# Patient Record
Sex: MALE | Race: BLACK OR AFRICAN AMERICAN | NOT HISPANIC OR LATINO | Employment: UNEMPLOYED | URBAN - METROPOLITAN AREA
[De-identification: names, ages, dates, MRNs, and addresses within clinical notes are randomized per-mention and may not be internally consistent; named-entity substitution may affect disease eponyms.]

---

## 2017-01-10 ENCOUNTER — TRANSCRIBE ORDERS (OUTPATIENT)
Dept: ADMINISTRATIVE | Facility: HOSPITAL | Age: 47
End: 2017-01-10

## 2017-01-10 DIAGNOSIS — R91.8 PULMONARY NODULES: Primary | ICD-10-CM

## 2017-01-27 ENCOUNTER — HOSPITAL ENCOUNTER (OUTPATIENT)
Dept: CT IMAGING | Facility: HOSPITAL | Age: 47
Discharge: HOME | End: 2017-01-27
Admitting: INTERNAL MEDICINE

## 2017-01-27 DIAGNOSIS — R91.8 PULMONARY NODULES: ICD-10-CM

## 2017-01-27 PROCEDURE — 71250 CT THORAX DX C-: CPT

## 2017-09-07 ENCOUNTER — TRANSCRIBE ORDERS (OUTPATIENT)
Dept: ADMINISTRATIVE | Facility: HOSPITAL | Age: 47
End: 2017-09-07

## 2017-09-07 DIAGNOSIS — R91.8 ABNORMAL X-RAY OF LUNG: Primary | ICD-10-CM

## 2017-09-22 ENCOUNTER — HOSPITAL ENCOUNTER (OUTPATIENT)
Dept: CT IMAGING | Facility: HOSPITAL | Age: 47
Discharge: HOME | End: 2017-09-22
Admitting: INTERNAL MEDICINE

## 2017-09-22 DIAGNOSIS — R91.8 ABNORMAL X-RAY OF LUNG: ICD-10-CM

## 2017-09-22 PROCEDURE — 71250 CT THORAX DX C-: CPT

## 2019-04-08 ENCOUNTER — TRANSCRIBE ORDERS (OUTPATIENT)
Dept: ADMINISTRATIVE | Facility: HOSPITAL | Age: 49
End: 2019-04-08

## 2019-04-08 DIAGNOSIS — R91.8 OTHER NONSPECIFIC ABNORMAL FINDING OF LUNG FIELD: Primary | ICD-10-CM

## 2019-04-18 ENCOUNTER — HOSPITAL ENCOUNTER (OUTPATIENT)
Dept: CT IMAGING | Facility: HOSPITAL | Age: 49
Discharge: HOME | End: 2019-04-18
Admitting: NURSE PRACTITIONER

## 2019-04-18 DIAGNOSIS — R91.8 OTHER NONSPECIFIC ABNORMAL FINDING OF LUNG FIELD: ICD-10-CM

## 2019-04-18 PROCEDURE — 71250 CT THORAX DX C-: CPT

## 2019-06-20 ENCOUNTER — TRANSCRIBE ORDERS (OUTPATIENT)
Dept: ADMINISTRATIVE | Facility: HOSPITAL | Age: 49
End: 2019-06-20

## 2019-06-20 DIAGNOSIS — M25.512 PAIN IN SHOULDER REGION, LEFT: Primary | ICD-10-CM

## 2019-07-22 ENCOUNTER — OFFICE VISIT (OUTPATIENT)
Dept: NEUROSURGERY | Facility: CLINIC | Age: 49
End: 2019-07-22

## 2019-07-22 VITALS
BODY MASS INDEX: 33.46 KG/M2 | DIASTOLIC BLOOD PRESSURE: 79 MMHG | HEART RATE: 72 BPM | SYSTOLIC BLOOD PRESSURE: 147 MMHG | HEIGHT: 71 IN | WEIGHT: 239 LBS

## 2019-07-22 DIAGNOSIS — Z00.00 HEALTHCARE MAINTENANCE: ICD-10-CM

## 2019-07-22 DIAGNOSIS — M54.12 CERVICAL RADICULOPATHY DUE TO TRAUMA: Primary | ICD-10-CM

## 2019-07-22 DIAGNOSIS — M54.2 NECK PAIN: ICD-10-CM

## 2019-07-22 PROCEDURE — 99203 OFFICE O/P NEW LOW 30 MIN: CPT | Performed by: PHYSICIAN ASSISTANT

## 2019-07-22 RX ORDER — ALBUTEROL SULFATE 2.5 MG/3ML
SOLUTION RESPIRATORY (INHALATION)
COMMUNITY
Start: 2019-07-03

## 2019-07-22 RX ORDER — INSULIN NPH HUM/REG INSULIN HM 70-30/ML
VIAL (ML) SUBCUTANEOUS
COMMUNITY
Start: 2019-07-15

## 2019-07-22 RX ORDER — LACOSAMIDE 50 MG
TABLET ORAL
Refills: 0 | COMMUNITY
Start: 2019-05-21

## 2019-07-22 RX ORDER — CALCITRIOL 0.25 UG/1
CAPSULE, LIQUID FILLED ORAL
COMMUNITY
Start: 2019-05-06

## 2019-07-22 RX ORDER — GABAPENTIN 300 MG/1
300 CAPSULE ORAL
Qty: 30 CAPSULE | Refills: 0 | Status: SHIPPED | OUTPATIENT
Start: 2019-07-22 | End: 2019-07-22 | Stop reason: ALTCHOICE

## 2019-07-22 RX ORDER — CARVEDILOL 25 MG/1
TABLET ORAL
COMMUNITY
Start: 2019-07-03

## 2019-07-22 RX ORDER — FUROSEMIDE 20 MG/1
TABLET ORAL
COMMUNITY
Start: 2019-05-06 | End: 2019-09-09 | Stop reason: ALTCHOICE

## 2019-07-22 RX ORDER — PREDNISOLONE ACETATE 10 MG/ML
1 SUSPENSION/ DROPS OPHTHALMIC 4 TIMES DAILY
COMMUNITY
Start: 2014-09-04

## 2019-07-22 RX ORDER — ALLOPURINOL 100 MG/1
TABLET ORAL
COMMUNITY
Start: 2019-07-03

## 2019-07-22 RX ORDER — METOPROLOL TARTRATE 50 MG/1
25 TABLET, FILM COATED ORAL DAILY
COMMUNITY

## 2019-07-22 RX ORDER — OFLOXACIN 3 MG/ML
SOLUTION/ DROPS OPHTHALMIC
Refills: 0 | COMMUNITY
Start: 2019-06-24

## 2019-07-22 RX ORDER — AMLODIPINE BESYLATE 10 MG/1
TABLET ORAL
COMMUNITY
Start: 2019-07-03
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
## 2019-07-31 ENCOUNTER — HOSPITAL ENCOUNTER (OUTPATIENT)
Dept: MRI IMAGING | Facility: HOSPITAL | Age: 49
Discharge: HOME | End: 2019-07-31
Admitting: PHYSICIAN ASSISTANT

## 2019-07-31 DIAGNOSIS — M54.12 CERVICAL RADICULOPATHY DUE TO TRAUMA: ICD-10-CM

## 2019-07-31 PROCEDURE — 72141 MRI NECK SPINE W/O DYE: CPT

## 2019-08-19 ENCOUNTER — OFFICE VISIT (OUTPATIENT)
Dept: NEUROSURGERY | Facility: CLINIC | Age: 49
End: 2019-08-19

## 2019-08-19 VITALS
OXYGEN SATURATION: 99 % | HEIGHT: 71 IN | WEIGHT: 240 LBS | HEART RATE: 73 BPM | SYSTOLIC BLOOD PRESSURE: 144 MMHG | DIASTOLIC BLOOD PRESSURE: 75 MMHG | BODY MASS INDEX: 33.6 KG/M2 | RESPIRATION RATE: 18 BRPM

## 2019-08-19 DIAGNOSIS — M54.12 BRACHIAL NEURITIS OR RADICULITIS: Primary | ICD-10-CM

## 2019-08-19 PROCEDURE — 99213 OFFICE O/P EST LOW 20 MIN: CPT | Performed by: NEUROLOGICAL SURGERY

## 2019-08-19 RX ORDER — GABAPENTIN 300 MG/1
300 CAPSULE ORAL NIGHTLY
Qty: 90 CAPSULE | Refills: 2 | Status: SHIPPED | OUTPATIENT
Start: 2019-08-19

## 2019-08-19 NOTE — PROGRESS NOTES
"Subjective   Wilfredo Baldwin is a 49 y.o. male.     Chief Complaint   Patient presents with   • Follow-up     f/u after MRI      Visit Vitals  /75 (BP Location: Left arm, Patient Position: Sitting, Cuff Size: Large Adult)   Pulse 73   Resp 18   Ht 180.3 cm (71\")   Wt 109 kg (240 lb)   SpO2 99%   BMI 33.47 kg/m²       History of Present Illness: Mr. Baldwin is here today for follow-up review of his MRI of the cervical spine.  He states that he cannot take the anti-inflammatory secondary to his kidney disease and he did not try the Neurontin secondary to trying it before for his diabetic neuropathy.  He still describes tightness and pain in the left parascapular region just at the mid scapular border.  It does radiate down the arms at times.  He denies any gait dysfunction, urinary urgency or incontinence.  He does subjectively feel like his hand is weaker.    The following portions of the patient's history were reviewed and updated as appropriate: allergies, current medications, past family history, past medical history, past social history, past surgical history and problem list.    Review of Systems      Past Surgical History:   Procedure Laterality Date   • EYE SURGERY         Past Medical History:   Diagnosis Date   • COPD (chronic obstructive pulmonary disease) (CMS/[facility])    • Diabetes mellitus (CMS/[facility])    • Hypertension    • Seizures (CMS/[facility])        Objective   Physical Exam  Neurologic Exam  Ortho Exam  Motor strength is 5/5 in both upper and lower extremities except for left  which is 4-5 with very poor effort.  No demonstrable sensory deficit in a dermatomal pattern  Tender to palpation left paraspinal musculature  Normal shoulder exam of the left  Improvement of neck pain on traction  Decreased cervical range of motion with 45 degrees flexion, 40 degrees extension, 35 degrees bilateral rotation    DATE OF EXAM:   7/31/2019 9:46 AM     PROCEDURE:   MRI CERVICAL SPINE WO CONTRAST-   "   INDICATIONS:   Cervical myelopathy; M54.12-Radiculopathy, cervical region     COMPARISON:  07/22/2019.     TECHNIQUE:   Routine magnetic resonance imaging of the cervical spine was performed  without administration of contrast.     FINDINGS:   A routine cervical spine MRI exam was performed. No cord signal  abnormality is seen. No anteroposterior alignment abnormality is  identified. The cerebellar tonsils are within normal limits.     On axial imaging at C2-C3, no spinal canal or foraminal narrowing. No  significant disc herniation.     At C3-4, mild diffuse annular disc bulge is seen. There may be a tiny  left paracentral disc herniation (protrusion), which measures  approximately 0.5 x 0.2 cm in transverse and AP extent, respectively, as  seen on image 9 of series 6. There is mild left foraminal narrowing and  no significant right foraminal narrowing. No significant spinal canal  narrowing is seen.     At C4-5, there is mild left foraminal narrowing. No significant right  foraminal narrowing. Minimal diffuse annular disc bulge is seen. No disc  herniation. No spinal canal narrowing.     At C5-6, there is moderate to severe left foraminal narrowing. Minimal  if any right foraminal narrowing is seen. There is mild diffuse annular  disc bulge. Minimal uncovertebral osteoarthritis is suggested. Minimal  if any facet osteoarthritis is seen. There is minimal spinal canal  narrowing. No disc herniation is suggested.     At C6-7, there is mild bilateral foraminal narrowing. Mild diffuse  annular disc bulge is seen. Minimal if any spinal canal narrowing is  seen. No disc herniation is identified.     At C7-T1, no spinal canal or foraminal narrowing. No disc herniation is  seen.     At T1-2, no spinal canal or foraminal narrowing. No disc herniation is  seen.     Additionally, no acute vertebral compression fractures are seen. The  C1-2 level was not completely imaged axially.     IMPRESSION:  IMPRESSION :   There is  left-sided foraminal narrowing at several levels, greatest at  C5-6. Please correlate clinically, especially with C6 radicular  symptoms.     No significant disc herniations are seen.     No significant spinal canal narrowing.     No anteroposterior alignment abnormality is seen.     No acute cervical spine vertebral compression fractures are seen.     No cord signal abnormalities appreciated.     Please see above comments for further detail.     Electronically Signed By-Dr. Anirudh Sinclair MD On:7/31/2019 2:26 PM  This report was finalized on [id number] by Dr. Anirudh Sinclair MD.      Assessment and Plan: I have personally reviewed the MRI of the cervical spine.  There was report of left-sided neuroforaminal stenosis greatest at C5-6 I do not see any significant neuroforaminal stenosis.  His motor effort does not match the pattern and he does suffer from diabetic neuropathy.  I feel he suffers more from radiculitis than actual radiculopathy.  He has known diabetic neuropathy which I think predisposes him for neuritis.  I do not feel with his diabetes that steroids are appropriate at this time and unfortunately with his renal function we cannot start him on anti-inflammatories.  At this time we will start a course of cervical traction at 20-25 pounds for 20-30 minutes 3 times a day.  Also start a course of Neurontin 300 mg p.o. nightly which she can increase to 300 mg twice daily within 2 weeks.  I will also refer him to interventional pain management for dedicated epidural injections.  I will see him back in approximately 1 month to see how he has responded.      Problems Addressed this Visit     None

## 2019-08-19 NOTE — PROGRESS NOTES
Spoke with patient, will come this AM prior to lunch.  Dr. Corbett has a drain to put in at the hospital so Mr. Baldwin will be here momentarily.

## 2019-09-09 ENCOUNTER — OFFICE VISIT (OUTPATIENT)
Dept: PAIN MEDICINE | Facility: CLINIC | Age: 49
End: 2019-09-09

## 2019-09-09 VITALS
SYSTOLIC BLOOD PRESSURE: 149 MMHG | HEIGHT: 71 IN | DIASTOLIC BLOOD PRESSURE: 88 MMHG | RESPIRATION RATE: 16 BRPM | WEIGHT: 239 LBS | TEMPERATURE: 97.7 F | HEART RATE: 67 BPM | OXYGEN SATURATION: 98 % | BODY MASS INDEX: 33.46 KG/M2

## 2019-09-09 DIAGNOSIS — M54.12 CERVICAL RADICULITIS: ICD-10-CM

## 2019-09-09 DIAGNOSIS — M54.2 CERVICALGIA: Primary | ICD-10-CM

## 2019-09-09 DIAGNOSIS — M50.30 DDD (DEGENERATIVE DISC DISEASE), CERVICAL: ICD-10-CM

## 2019-09-09 DIAGNOSIS — M47.812 CERVICAL SPONDYLOSIS WITHOUT MYELOPATHY: ICD-10-CM

## 2019-09-09 PROCEDURE — 99204 OFFICE O/P NEW MOD 45 MIN: CPT | Performed by: PHYSICAL MEDICINE & REHABILITATION

## 2019-09-09 PROCEDURE — G0463 HOSPITAL OUTPT CLINIC VISIT: HCPCS | Performed by: PHYSICAL MEDICINE & REHABILITATION

## 2019-09-09 RX ORDER — SYRINGE AND NEEDLE,INSULIN,1ML 31 GX5/16"
SYRINGE, EMPTY DISPOSABLE MISCELLANEOUS
COMMUNITY
Start: 2019-07-15

## 2019-09-09 RX ORDER — BLOOD-GLUCOSE METER
KIT MISCELLANEOUS
COMMUNITY
Start: 2019-09-03

## 2019-09-09 RX ORDER — ERGOCALCIFEROL 1.25 MG/1
CAPSULE ORAL
COMMUNITY
Start: 2019-09-03

## 2019-09-09 RX ORDER — FUROSEMIDE 40 MG/1
TABLET ORAL
COMMUNITY
Start: 2019-09-03

## 2019-09-09 NOTE — PROGRESS NOTES
Subjective   Wilfredo Baldwin is a 49 y.o. male.     Neck pain radiating to left shoulder, 9/10 at worst, and best, constant, always present, burning, aching, worse with walking and standing, interferes with sleep, failed chiropractor. MRI C-spine with DDD and DJD. Saw Dr. Corbett, notes reviewed, as above, with referral for cervical ESIs, impaired renal function precluding NSAIDs and contrast medium. Denies anticoagulation. No FH of substance abuse.         The following portions of the patient's history were reviewed and updated as appropriate: allergies, current medications, past family history, past medical history, past social history, past surgical history and problem list.    Review of Systems   Constitutional: Positive for fatigue. Negative for chills and fever.   HENT: Positive for trouble swallowing. Negative for hearing loss.    Eyes: Positive for visual disturbance.   Respiratory: Positive for shortness of breath.    Cardiovascular: Negative for chest pain.   Gastrointestinal: Positive for abdominal pain, diarrhea and vomiting. Negative for constipation and nausea.   Genitourinary: Negative for urinary incontinence.   Musculoskeletal: Negative for arthralgias, back pain, joint swelling, myalgias and neck pain.   Neurological: Negative for dizziness, weakness, numbness and headache.       Objective   Physical Exam   Constitutional: He is oriented to person, place, and time. He appears well-developed and well-nourished.   HENT:   Head: Normocephalic and atraumatic.   Eyes: EOM are normal. Pupils are equal, round, and reactive to light.   Cardiovascular: Normal rate, regular rhythm, normal heart sounds and intact distal pulses.   Pulmonary/Chest: Breath sounds normal.   Abdominal: Soft. Bowel sounds are normal. He exhibits no distension. There is no tenderness.   Neurological: He is alert and oriented to person, place, and time. He has normal strength and normal reflexes. He displays normal reflexes. No  sensory deficit.   Psychiatric: He has a normal mood and affect. His behavior is normal. Thought content normal.         Assessment/Plan   Wilfredo was seen today for neck pain, shoulder pain, arm pain and headache.    Diagnoses and all orders for this visit:    Cervicalgia    Cervical spondylosis without myelopathy    Cervical radiculitis    DDD (degenerative disc disease), cervical        Schedule 3 left cervical ESIs without contrast.  No change to meds today, taking Gabapentin with Dr. Corbett.  RTC for ESIs.

## 2019-09-11 ENCOUNTER — APPOINTMENT (OUTPATIENT)
Dept: PHYSICAL THERAPY | Facility: HOSPITAL | Age: 49
End: 2019-09-11

## 2019-09-16 ENCOUNTER — OFFICE VISIT (OUTPATIENT)
Dept: PHYSICAL THERAPY | Facility: CLINIC | Age: 49
End: 2019-09-16

## 2019-09-16 DIAGNOSIS — M54.2 CERVICALGIA: ICD-10-CM

## 2019-09-16 DIAGNOSIS — M54.12 BRACHIAL NEURITIS: Primary | ICD-10-CM

## 2019-09-16 PROCEDURE — 97140 MANUAL THERAPY 1/> REGIONS: CPT | Performed by: PHYSICAL THERAPIST

## 2019-09-16 PROCEDURE — 97161 PT EVAL LOW COMPLEX 20 MIN: CPT | Performed by: PHYSICAL THERAPIST

## 2019-09-16 PROCEDURE — 97014 ELECTRIC STIMULATION THERAPY: CPT | Performed by: PHYSICAL THERAPIST
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
## 2019-09-24 ENCOUNTER — TREATMENT (OUTPATIENT)
Dept: PHYSICAL THERAPY | Facility: CLINIC | Age: 49
End: 2019-09-24

## 2019-09-24 DIAGNOSIS — M54.12 BRACHIAL NEURITIS: Primary | ICD-10-CM

## 2019-09-24 DIAGNOSIS — M54.2 CERVICALGIA: ICD-10-CM

## 2019-09-24 PROCEDURE — 97140 MANUAL THERAPY 1/> REGIONS: CPT | Performed by: PHYSICAL THERAPIST

## 2019-09-24 PROCEDURE — 97014 ELECTRIC STIMULATION THERAPY: CPT | Performed by: PHYSICAL THERAPIST

## 2019-09-24 PROCEDURE — 97110 THERAPEUTIC EXERCISES: CPT | Performed by: PHYSICAL THERAPIST

## 2019-09-24 NOTE — PROGRESS NOTES
Physical Therapy Daily Progress Note    VISIT#: 2    Subjective   Wilfredo Baldwin reports that he has still had a lot of pain in the middle spine lately. He is wanting to get his injections sooner.  Pain Ratin    Objective     See Exercise, Manual, and Modality Logs for complete treatment.     Patient Education: importance of movement to reduce pain    Assessment & Plan     Assessment  Assessment details: The patient demonstrated an increase in tone of the left upper trapezius. The patient demonstrated wincing with palpation of all cervical musculature stating that it increase his pain. Patient had a decrease in symptoms following modalities.        Progress per Plan of Care and Progress strengthening /stabilization /functional activity          Timed:         Manual Therapy:    15     mins  74295;     Therapeutic Exercise:    15     mins  72753;     Neuromuscular Nellie:        mins  47763;    Therapeutic Activity:          mins  63630;     Gait Training:           mins  78348;     Ultrasound:          mins  46221;    Ionto                                   mins   28257  Self Care                            mins   67645  Canalith Repos                   mins  83587    Un-Timed:  Electrical Stimulation:    15     mins  58032 ( );  Dry Needling          mins self-pay  Traction          mins 82195  Low Eval          Mins  82483  Mod Eval          Mins  43679  High Eval                            Mins  91167  Re-Eval                               mins  57591    Timed Treatment:   30   mins   Total Treatment:     50   mins    Merle Hernández PT  Physical Therapist  [place] License # [id number]

## 2019-09-26 ENCOUNTER — TREATMENT (OUTPATIENT)
Dept: PHYSICAL THERAPY | Facility: CLINIC | Age: 49
End: 2019-09-26

## 2019-09-26 DIAGNOSIS — M54.12 BRACHIAL NEURITIS: Primary | ICD-10-CM

## 2019-09-26 DIAGNOSIS — M54.2 CERVICALGIA: ICD-10-CM

## 2019-09-26 PROCEDURE — 97014 ELECTRIC STIMULATION THERAPY: CPT | Performed by: PHYSICAL THERAPIST

## 2019-09-26 PROCEDURE — 97140 MANUAL THERAPY 1/> REGIONS: CPT | Performed by: PHYSICAL THERAPIST

## 2019-09-26 PROCEDURE — 97110 THERAPEUTIC EXERCISES: CPT | Performed by: PHYSICAL THERAPIST

## 2019-09-26 NOTE — PROGRESS NOTES
Physical Therapy Daily Progress Note    VISIT#: 3    Subjective   Wilfredo Baldwin reports that he is getting a little better but he still feels like he needs to get an injection.  Pain Ratin    Objective     See Exercise, Manual, and Modality Logs for complete treatment.     Patient Education: exercise rationale, importance of relaxation of cervical musculature to reduce muscle tone    Assessment & Plan     Assessment  Assessment details: The patient demonstrated wincing and guarding with transitional movements which could be contributing to the hypertonicity of the cervical musculature. The patient was educated on importance of relaxation of the cervical musculature to reduce muscle tone.        Progress per Plan of Care and Progress strengthening /stabilization /functional activity          Timed:         Manual Therapy:    18     mins  71779;     Therapeutic Exercise:    15     mins  95338;     Neuromuscular Nellie:        mins  02124;    Therapeutic Activity:          mins  53251;     Gait Training:           mins  58835;     Ultrasound:          mins  89596;    Ionto                                   mins   09009  Self Care                            mins   01085  Canalith Repos                   mins  16340    Un-Timed:  Electrical Stimulation:    15     mins  41835 ( );  Dry Needling          mins self-pay  Traction          mins 73831  Low Eval          Mins  52347  Mod Eval          Mins  01857  High Eval                            Mins  20133  Re-Eval                               mins  39043    Timed Treatment:   33   mins   Total Treatment:     50   mins    Merle Hernández PT  Physical Therapist  [place] License # [id number]

## 2019-10-01 ENCOUNTER — TREATMENT (OUTPATIENT)
Dept: PHYSICAL THERAPY | Facility: CLINIC | Age: 49
End: 2019-10-01

## 2019-10-01 DIAGNOSIS — M54.12 BRACHIAL NEURITIS: Primary | ICD-10-CM

## 2019-10-01 DIAGNOSIS — M54.2 CERVICALGIA: ICD-10-CM

## 2019-10-01 PROCEDURE — 97140 MANUAL THERAPY 1/> REGIONS: CPT | Performed by: PHYSICAL THERAPIST

## 2019-10-01 PROCEDURE — 97014 ELECTRIC STIMULATION THERAPY: CPT | Performed by: PHYSICAL THERAPIST

## 2019-10-01 PROCEDURE — 97110 THERAPEUTIC EXERCISES: CPT | Performed by: PHYSICAL THERAPIST

## 2019-10-01 NOTE — PROGRESS NOTES
Physical Therapy Daily Progress Note      Patient: Wilfredo Baldwin   : 1970  Diagnosis/ICD-10 Code:  Brachial neuritis [M54.12]  Referring practitioner: Live Corbett MD  Date of Initial Visit: Type: THERAPY  Noted: 2019  Today's Date: 10/1/2019  Patient seen for 4 sessions             Subjective Pt states that he still has pain in his lower cervical/upper thoracic and into his left UT.  He also states that his LB has been hurting him lately.    Objective   See Exercise, Manual, and Modality Logs for complete treatment.       Assessment/Plan  Pt tolerated modalities and manual technique well today.  He was doing well with exercises until the last few reps of addition of t-band rows (elbows bent) where he started having increase burning sensation in his lower cervical/upper thoracic and left UT.  Probably caused by pt holding contraction too long and/or resistance was a little too strong.  Ice was administered to cervical region which seemed to help decrease symptoms.    Progress per Plan of Care           Timed:         Manual Therapy:    15     mins  32362;     Therapeutic Exercise:    20     mins  09594;     Neuromuscular Nellie:        mins  03117;    Therapeutic Activity:          mins  29319;     Gait Training:           mins  62586;     Ultrasound:          mins  98495;    Ionto                                   mins   17351  Self Care                            mins   84592      Un-Timed:  Electrical Stimulation:    15     mins  57878 ( );  Dry Needling          mins self-pay  Traction          mins 62844      Timed Treatment:   35   mins   Total Treatment:     65  mins    Karthik Mansfield PTA  Physical Therapist

## 2019-10-07 ENCOUNTER — HOSPITAL ENCOUNTER (OUTPATIENT)
Dept: PAIN MEDICINE | Facility: HOSPITAL | Age: 49
Discharge: HOME | End: 2019-10-07
Admitting: PHYSICAL MEDICINE & REHABILITATION

## 2019-10-07 VITALS
HEIGHT: 71 IN | BODY MASS INDEX: 32.9 KG/M2 | DIASTOLIC BLOOD PRESSURE: 85 MMHG | OXYGEN SATURATION: 99 % | TEMPERATURE: 97.8 F | SYSTOLIC BLOOD PRESSURE: 157 MMHG | WEIGHT: 235 LBS | HEART RATE: 70 BPM | RESPIRATION RATE: 16 BRPM

## 2019-10-07 DIAGNOSIS — M54.2 CERVICALGIA: Primary | ICD-10-CM

## 2019-10-07 DIAGNOSIS — M54.12 CERVICAL RADICULITIS: ICD-10-CM

## 2019-10-07 PROCEDURE — 62321 NJX INTERLAMINAR CRV/THRC: CPT | Performed by: PHYSICAL MEDICINE & REHABILITATION

## 2019-10-07 PROCEDURE — 25010000002 DEXAMETHASONE SODIUM PHOSPHATE 10 MG/ML SOLUTION

## 2019-10-07 RX ORDER — DEXAMETHASONE SODIUM PHOSPHATE 10 MG/ML
INJECTION, SOLUTION INTRAMUSCULAR; INTRAVENOUS
Status: DISCONTINUED
Start: 2019-10-07 | End: 2019-10-08 | Stop reason: HOSPADM

## 2019-10-07 NOTE — PATIENT INSTRUCTIONS
Epidural Steroid Injection, Care After  Refer to this sheet in the next few weeks. These instructions provide you with information about caring for yourself after your procedure. Your health care provider may also give you more specific instructions. Your treatment has been planned according to current medical practices, but problems sometimes occur. Call your health care provider if you have any problems or questions after your procedure.  What can I expect after the procedure?  After your procedure, it is common to feel a little discomfort at the injection site.  Follow these instructions at home:  · For 24 hours after the procedure:  ? Avoid using heat on the injection site.  ? Do not take a tub bath, and do not soak in water.  ? Do not drive if you received a medicine to help you relax (sedative).  · If directed, put ice on the injection site:  ? Put ice in a plastic bag.  ? Place a towel between your skin and the bag.  ? Leave the ice on for 20 minutes, 2-3 times a day.  · Return to your normal activities as told by your health care provider. Ask your health care provider what activities are safe for you.  · You may remove the bandage (dressing) after 24 hours.  · Take over-the-counter and prescription medicines only as told by your health care provider.  · Keep all follow-up visits as told by your health care provider. This is important.  Contact a health care provider if:  · You have a fever.  · You continue to have pain and soreness around the injection site, even after taking over-the-counter pain medicine.  · You have severe, sudden, or lasting nausea or vomiting.  Get help right away if:  · You have severe pain at the injection site that is not relieved by medicines.  · You develop a severe headache or a stiff neck.  · You become sensitive to light.  · You have any new numbness or weakness in your legs or arms.  · You lose control of your bladder or bowel movements.  · You have trouble breathing.  This  information is not intended to replace advice given to you by your health care provider. Make sure you discuss any questions you have with your health care provider.  Document Released: 04/03/2012 Document Revised: 05/25/2017 Document Reviewed: 04/04/2017  [employer] Interactive Patient Education © 2019 [employer]

## 2019-10-07 NOTE — H&P
Patient Care Team:  Provider, No Known as PCP - General    Chief complaint Neck pain    Subjective     Neck pain radiating to left shoulder, 9/10 at worst, and best, constant, always present, burning, aching, worse with walking and standing, interferes with sleep, failed chiropractor. MRI C-spine with DDD and DJD. Saw Dr. Corbett, notes reviewed, as above, with referral for cervical ESIs, impaired renal function precluding NSAIDs and contrast medium. Denies anticoagulation. No FH of substance abuse. Here for 1st left cervical DINA without contrast, denies current infection or ABX, anticoagulation.        Review of Systems     Past Medical History:   Diagnosis Date   • Anxiety    • Arm pain     lt   • Chronic kidney disease    • COPD (chronic obstructive pulmonary disease) (CMS/HCC)    • Diabetes mellitus (CMS/HCC)    • Headache    • Hypertension    • Injury of back    • Injury of neck    • Neck pain    • Seizures (CMS/HCC)    • Shortness of breath    • Shoulder pain     lt     Past Surgical History:   Procedure Laterality Date   • EYE SURGERY       Family History   Problem Relation Age of Onset   • Stroke Mother    • Hyperlipidemia Father    • Heart disease Father    • Diabetes Father      Social History     Tobacco Use   • Smoking status: Former Smoker     Types: Cigarettes   • Smokeless tobacco: Never Used   Substance Use Topics   • Alcohol use: No     Frequency: Never   • Drug use: No       (Not in a hospital admission)  Allergies:  Patient has no known allergies.    Objective      Vital Signs  Temp:  [97.8 °F (36.6 °C)] 97.8 °F (36.6 °C)  Heart Rate:  [74] 74  Resp:  [16] 16  BP: (127)/(74) 127/74    Physical Exam    Results Review:   None      Assessment/Plan       * No active hospital problems. *      ICD-10-CM ICD-9-CM   1. Cervicalgia M54.2 723.1   2. Cervical radiculitis M54.12 723.4         Assessment & Plan    I discussed the patients findings and my recommendations with patient     1st of 3 left  cervical ESIs without contrast.  No change to meds today, taking Gabapentin with Dr. Corbett.  RTC for ESIs.      Cervical Epidural Steroid Injection    PREOPERATIVE DIAGNOSIS: Cervical spinal stenosis    POSTOPERATIVE DIAGNOSIS: Cervical spinal stenosis    PROCEDURE PERFORMED: Cervical Epidural Steroid Injection    The patient presents with a history of  [ cervical ] degenerative disc disease. The patient presents today for a [ cervical ]  epidural steroid injection at level left C7-T1. The patient understands the risks and benefits of the procedure and wishes to proceed.  The patient was seen in the preoperative area.  Patient's consent was obtained and updated.  Vitals were taken.  Patient was then brought to the procedure suite and placed in a prone position. The appropriate anatomic area was widely prepped with Chloraprep and draped in a sterile fashion. Under fluoroscopic guidance using AP view a 20 gauge styleted tuohy needle was passed through skin anesthetized with 1% Lidocaine without epinephrine.  The needle was advanced using the continuous loss of resistance technique into the C7-T1 epidural space after first advancing to the T1 lamina, then redirecting in a superior and medial fashion. Needle tip placement in the epidural space was confirmed by loss of resistance. Preservative free contrast not used due to renal issues.  Following this [ 3 ] mL of a solution containing [ Dexamethasone 10mg and saline 2ml ] was carefully administered in the epidural space.A sterile dressing was placed over the puncture site.    The patient tolerated the procedure with [ no complications ]. They were then brought to the post procedure area where they recovered nicely.         Momo Shoemaker MD  10/07/19  2:41 PM    Time: Discharge 15 min

## 2019-10-08 ENCOUNTER — TREATMENT (OUTPATIENT)
Dept: PHYSICAL THERAPY | Facility: CLINIC | Age: 49
End: 2019-10-08

## 2019-10-08 DIAGNOSIS — M54.2 CERVICALGIA: ICD-10-CM

## 2019-10-08 DIAGNOSIS — M54.12 BRACHIAL NEURITIS: Primary | ICD-10-CM

## 2019-10-08 PROCEDURE — 97014 ELECTRIC STIMULATION THERAPY: CPT | Performed by: PHYSICAL THERAPIST

## 2019-10-08 PROCEDURE — 97535 SELF CARE MNGMENT TRAINING: CPT | Performed by: PHYSICAL THERAPIST

## 2019-10-08 NOTE — PROGRESS NOTES
"Physical Therapy Daily Progress Note    VISIT#: 5    Subjective   Wilfredo Baldwin reports that he had his injections into his neck today. His physician said no strenuous activity today, no wetness around the area, and no pressure to the area per patient report.  Pain Ratin    Objective     See Exercise, Manual, and Modality Logs for complete treatment.     Patient Education: importance of movement following injections to reduce pain, return to physician for follow up after leaving therapy    Assessment & Plan     Assessment  Assessment details: The patient was seen today following injections the previous day (24 hours after). He reported severe pain which he stated felt like \"needle sticks\". He did not wish to perform any exercises or manual therapy because he was in so much pain. Electrical stimulation was performed on the lumbar spine for general pain relief. The patient was advised to perform light ROM exercises at home to reduce pain. Patient verbally expressed pain throughout the session today and stated he was going to call his physician or stop in his office for pain medication due to his increase in pain. Exercises and manual therapy were deferred today due to injections and patient's severe pain. PT was cut short today due to patient wishing to leave to return to physician's office.        Progress per Plan of Care and Progress strengthening /stabilization /functional activity          Timed:         Manual Therapy:         mins  90562;     Therapeutic Exercise:         mins  18636;     Neuromuscular Nellie:        mins  67776;    Therapeutic Activity:          mins  95965;     Gait Training:           mins  61123;     Ultrasound:          mins  43689;    Ionto                                   mins   05656  Self Care                       8     mins   39159  Canalith Repos                   mins  95324    Un-Timed:  Electrical Stimulation:     15    mins  95535 ( );  Dry Needling          mins " self-pay  Traction          mins 63263  Low Eval          Mins  01735  Mod Eval          Mins  04557  High Eval                            Mins  93487  Re-Eval                               mins  23680    Timed Treatment:   23   mins   Total Treatment:     23   mins    Merle Hernández PT  Physical Therapist  [place] License # [id number]

## 2019-10-10 ENCOUNTER — TREATMENT (OUTPATIENT)
Dept: PHYSICAL THERAPY | Facility: CLINIC | Age: 49
End: 2019-10-10

## 2019-10-10 DIAGNOSIS — M54.2 CERVICALGIA: ICD-10-CM

## 2019-10-10 DIAGNOSIS — M54.12 BRACHIAL NEURITIS: Primary | ICD-10-CM

## 2019-10-10 PROCEDURE — 97110 THERAPEUTIC EXERCISES: CPT | Performed by: PHYSICAL THERAPIST

## 2019-10-10 PROCEDURE — 97014 ELECTRIC STIMULATION THERAPY: CPT | Performed by: PHYSICAL THERAPIST

## 2019-10-10 NOTE — PROGRESS NOTES
Physical Therapy Daily Progress Note    VISIT#: 6    Subjective   Wilfredo Baldwin reports that he called his physician's office and they told him to continue taking it easy with no pressure on the neck.   Pain Ratin    Objective     See Exercise, Manual, and Modality Logs for complete treatment.     Patient Education: importance of muscle relaxation, ice vs. heat    Assessment & Plan     Assessment  Assessment details: No manual therapy and light therapeutic exercises were performed today due to patient's continued pain. Per patient report he is unable to have pressure on the neck. Patient tolerated light exercises and modalities well today.        Progress per Plan of Care and Progress strengthening /stabilization /functional activity          Timed:         Manual Therapy:         mins  68691;     Therapeutic Exercise:    15     mins  64071;     Neuromuscular Nellie:        mins  73406;    Therapeutic Activity:          mins  82046;     Gait Training:           mins  86412;     Ultrasound:          mins  66540;    Ionto                                   mins   12130  Self Care                            mins   12913  Canalith Repos                   mins  46150    Un-Timed:  Electrical Stimulation:    15     mins  68999 ( );  Dry Needling          mins self-pay  Traction          mins 93526  Low Eval          Mins  74961  Mod Eval          Mins  97187  High Eval                            Mins  79170  Re-Eval                               mins  15220    Timed Treatment:   15   mins   Total Treatment:     30   mins    Merle Hernández PT  Physical Therapist  [place] License # [id number]

## 2019-10-14 ENCOUNTER — APPOINTMENT (OUTPATIENT)
Dept: PAIN MEDICINE | Facility: HOSPITAL | Age: 49
End: 2019-10-14

## 2019-10-17 ENCOUNTER — TREATMENT (OUTPATIENT)
Dept: PHYSICAL THERAPY | Facility: CLINIC | Age: 49
End: 2019-10-17

## 2019-10-17 DIAGNOSIS — M54.12 BRACHIAL NEURITIS: Primary | ICD-10-CM

## 2019-10-17 DIAGNOSIS — M54.2 CERVICALGIA: ICD-10-CM

## 2019-10-17 PROCEDURE — 97014 ELECTRIC STIMULATION THERAPY: CPT | Performed by: PHYSICAL THERAPIST

## 2019-10-17 NOTE — PROGRESS NOTES
"     Physical Therapy Daily Progress Note      Patient: Wilfredo Baldwin   : 1970  Diagnosis/ICD-10 Code:  Brachial neuritis [M54.12]  Referring practitioner: Live Corbett MD  Date of Initial Visit: Type: THERAPY  Noted: 2019  Today's Date: 10/17/2019  Patient seen for 7 sessions             Subjective Pt says that ever since he had to epidural injection, his blood sugar and pressure have been \"skyrocketed\".  He says that he has been feeling quesy in his stomach and is still having a lot of pain in his left UT.  He would like to only have stim and MH to his left UT today, holding off from STM and exercises.      Objective   See Exercise, Manual, and Modality Logs for complete treatment.       Assessment/Plan Pt had good tolerance with stim and MH today, but still had stomach issues from elevated blood sugar.    Progress per Plan of Care           Timed:         Manual Therapy:         mins  77062;     Therapeutic Exercise:         mins  59443;     Neuromuscular Nellie:        mins  41503;    Therapeutic Activity:          mins  86452;     Gait Training:           mins  45020;     Ultrasound:          mins  48801;    Ionto                                   mins   77429  Self Care                            mins   97626      Un-Timed:  Electrical Stimulation:    20     mins  96448 ( );  Dry Needling          mins self-pay  Traction          mins 44364      Timed Treatment:      mins   Total Treatment:     20   mins    Karthik Mansfield PTA  Physical Therapist      "

## 2019-10-25 ENCOUNTER — TELEPHONE (OUTPATIENT)
Dept: PAIN MEDICINE | Facility: CLINIC | Age: 49
End: 2019-10-25

## 2019-11-01 ENCOUNTER — TREATMENT (OUTPATIENT)
Dept: PHYSICAL THERAPY | Facility: CLINIC | Age: 49
End: 2019-11-01

## 2019-11-01 DIAGNOSIS — M54.2 CERVICALGIA: ICD-10-CM

## 2019-11-01 DIAGNOSIS — M54.12 BRACHIAL NEURITIS: Primary | ICD-10-CM

## 2019-11-01 PROCEDURE — 97110 THERAPEUTIC EXERCISES: CPT | Performed by: PHYSICAL THERAPIST

## 2019-11-01 PROCEDURE — 97140 MANUAL THERAPY 1/> REGIONS: CPT | Performed by: PHYSICAL THERAPIST

## 2019-11-01 PROCEDURE — 97014 ELECTRIC STIMULATION THERAPY: CPT | Performed by: PHYSICAL THERAPIST

## 2019-11-01 NOTE — PROGRESS NOTES
Physical Therapy Daily Progress Note    VISIT#: 8    Subjective   Wilfredo Baldwin reports that his pain has been decreasing from what it was. He contacted his provider and the nurse stated that his injections can take up to 2 weeks to work.  Pain Ratin    Objective     See Exercise, Manual, and Modality Logs for complete treatment.     Patient Education: referred pain patterns, exercise modifications    Assessment & Plan     Assessment  Assessment details: The patient was able to tolerate light manual therapy well today with no wincing, discomfort, or increase in symptoms. Patient demonstrated an increase in cervical rotation with exercises compared to previous visits. PT to continue working neck mobility and stability to reduce pain and to improve ROM.        Progress per Plan of Care and Progress strengthening /stabilization /functional activity          Timed:         Manual Therapy:    15     mins  66688;     Therapeutic Exercise:    15     mins  19554;     Neuromuscular Nellie:        mins  51626;    Therapeutic Activity:    5      mins  40914;     Gait Training:           mins  24161;     Ultrasound:          mins  74062;    Ionto                                   mins   45418  Self Care                            mins   94710  Canalith Repos                   mins  17452    Un-Timed:  Electrical Stimulation:    15     mins  18727 ( );  Dry Needling          mins self-pay  Traction          mins 16836  Low Eval          Mins  80191  Mod Eval          Mins  68451  High Eval                            Mins  09736  Re-Eval                               mins  44098    Timed Treatment:   35   mins   Total Treatment:     50   mins    Merle Hernández PT  Physical Therapist  [place] License # [id number]

## 2019-11-08 ENCOUNTER — TREATMENT (OUTPATIENT)
Dept: PHYSICAL THERAPY | Facility: CLINIC | Age: 49
End: 2019-11-08

## 2019-11-08 DIAGNOSIS — M54.2 CERVICALGIA: ICD-10-CM

## 2019-11-08 DIAGNOSIS — M54.12 BRACHIAL NEURITIS: Primary | ICD-10-CM

## 2019-11-08 PROCEDURE — 97014 ELECTRIC STIMULATION THERAPY: CPT | Performed by: PHYSICAL THERAPIST

## 2019-11-08 PROCEDURE — 97110 THERAPEUTIC EXERCISES: CPT | Performed by: PHYSICAL THERAPIST

## 2019-11-08 NOTE — PROGRESS NOTES
Physical Therapy Daily Progress Note      Patient: Wilfredo Baldwin   : 1970  Diagnosis/ICD-10 Code:  Brachial neuritis [M54.12]  Referring practitioner: Live Corbett MD  Date of Initial Visit: Type: THERAPY  Noted: 2019  Today's Date: 2019  Patient seen for 9 sessions             Subjective Pt says that his neck is feeling better.  However, he is having pain in his mid thoracic spine today and would like to start with stim followed by using the bike.    Objective   See Exercise, Manual, and Modality Logs for complete treatment.       Assessment/Plan  Good tolerance with stim.  Pt said that he wanted to do more active exercises but was tired afterwards of performing scap retractions, leg bike then arm bike.    Progress per Plan of Care  Progress strengthening and stabilization exercises as tolerated.           Timed:         Manual Therapy:         mins  71448;     Therapeutic Exercise:    15     mins  78251;     Neuromuscular Nellie:        mins  11768;    Therapeutic Activity:          mins  24955;     Gait Training:           mins  41746;     Ultrasound:          mins  17198;    Ionto                                   mins   33001  Self Care                            mins   74025      Un-Timed:  Electrical Stimulation:    15     mins  06524 ( );  Dry Needling          mins self-pay  Traction          mins 43930      Timed Treatment:   15   mins   Total Treatment:     30   mins    Karthik Mansfield PTA  Physical Therapist

## 2019-11-11 ENCOUNTER — APPOINTMENT (OUTPATIENT)
Dept: PAIN MEDICINE | Facility: HOSPITAL | Age: 49
End: 2019-11-11

## 2019-11-13 ENCOUNTER — TREATMENT (OUTPATIENT)
Dept: PHYSICAL THERAPY | Facility: CLINIC | Age: 49
End: 2019-11-13

## 2019-11-13 DIAGNOSIS — M54.12 BRACHIAL NEURITIS: Primary | ICD-10-CM

## 2019-11-13 DIAGNOSIS — M54.2 CERVICALGIA: ICD-10-CM

## 2019-11-13 PROCEDURE — 97140 MANUAL THERAPY 1/> REGIONS: CPT | Performed by: PHYSICAL THERAPIST

## 2019-11-13 PROCEDURE — 97110 THERAPEUTIC EXERCISES: CPT | Performed by: PHYSICAL THERAPIST

## 2019-11-13 PROCEDURE — 97530 THERAPEUTIC ACTIVITIES: CPT | Performed by: PHYSICAL THERAPIST

## 2019-11-13 NOTE — PROGRESS NOTES
Physical Therapy Daily Progress Note    VISIT#: 10    Subjective   Wilfredo Baldwin reports that the cold weather feels like its making his shoulder and neck worse today. The exercises with the bands burned too bad last time and he doesn't wish to do it today.  Pain Ratin    Objective     See Exercise, Manual, and Modality Logs for complete treatment.     Patient Education: exercise cueing    Assessment & Plan     Assessment  Assessment details: The patient had pain with standing rows at the last therapy session stating that it made his shoulder burn; this exercise was deferred today. The patient tolerated manual therapy and modalities well today.        Progress per Plan of Care and Progress strengthening /stabilization /functional activity          Timed:         Manual Therapy:    10     mins  51817;     Therapeutic Exercise:    15     mins  46819;     Neuromuscular Nellie:        mins  30753;    Therapeutic Activity:     10     mins  47379;     Gait Training:           mins  25217;     Ultrasound:          mins  68254;    Ionto                                   mins   09010  Self Care                            mins   55573  Canalith Repos                   mins  34885    Un-Timed:  Electrical Stimulation:         mins  94315 ( );  Dry Needling          mins self-pay  Traction          mins 45554  Low Eval          Mins  11917  Mod Eval          Mins  90450  High Eval                            Mins  17798  Re-Eval                               mins  03469    Timed Treatment:   35   mins   Total Treatment:     44   mins    Merle Hernández PT  Physical Therapist  [place] License # [id number]

## 2019-11-15 ENCOUNTER — TREATMENT (OUTPATIENT)
Dept: PHYSICAL THERAPY | Facility: CLINIC | Age: 49
End: 2019-11-15

## 2019-11-15 DIAGNOSIS — M54.2 CERVICALGIA: ICD-10-CM

## 2019-11-15 DIAGNOSIS — M54.12 BRACHIAL NEURITIS: Primary | ICD-10-CM

## 2019-11-15 PROCEDURE — 97110 THERAPEUTIC EXERCISES: CPT | Performed by: PHYSICAL THERAPIST

## 2019-11-15 PROCEDURE — 97014 ELECTRIC STIMULATION THERAPY: CPT | Performed by: PHYSICAL THERAPIST

## 2019-11-15 NOTE — PROGRESS NOTES
Physical Therapy Daily Progress Note      Patient: Wilfredo Baldwin   : 1970  Diagnosis/ICD-10 Code:  Brachial neuritis [M54.12]  Referring practitioner: Live Corbett MD  Date of Initial Visit: Type: THERAPY  Noted: 2019  Today's Date: 11/15/2019  Patient seen for 11 sessions             Subjective Pt says that his pain is in his left UT and would like to start therapy with MH and stim to said area.  He did not want to use t-band for rows as he says it makes his left UT burn.    Objective   See Exercise, Manual, and Modality Logs for complete treatment.       Assessment/Plan  Pt tolerated stim and MH well.  He also tolerated exercises well today but declined the need for manual STM to left UT.  He really likes using the UBE.    Progress per Plan of Care           Timed:         Manual Therapy:         mins  61923;     Therapeutic Exercise:    30     mins  39151;     Neuromuscular Nellie:        mins  67874;    Therapeutic Activity:          mins  78826;     Gait Training:           mins  18575;     Ultrasound:          mins  98647;    Ionto                                   mins   18689  Self Care                            mins   69777      Un-Timed:  Electrical Stimulation:    15     mins  13664 ( );  Dry Needling          mins self-pay  Traction          mins 26643      Timed Treatment:   30   mins   Total Treatment:     45   mins    Karthik Mansfield PTA  Physical Therapist

## 2019-11-20 ENCOUNTER — TREATMENT (OUTPATIENT)
Dept: PHYSICAL THERAPY | Facility: CLINIC | Age: 49
End: 2019-11-20

## 2019-11-20 DIAGNOSIS — M54.12 BRACHIAL NEURITIS: Primary | ICD-10-CM

## 2019-11-20 DIAGNOSIS — M54.2 CERVICALGIA: ICD-10-CM

## 2019-11-20 PROCEDURE — 97014 ELECTRIC STIMULATION THERAPY: CPT | Performed by: PHYSICAL THERAPIST

## 2019-11-20 PROCEDURE — 97110 THERAPEUTIC EXERCISES: CPT | Performed by: PHYSICAL THERAPIST

## 2019-11-20 PROCEDURE — 97140 MANUAL THERAPY 1/> REGIONS: CPT | Performed by: PHYSICAL THERAPIST

## 2019-11-20 NOTE — PROGRESS NOTES
Physical Therapy Daily Progress Note    VISIT#: 12    Subjective   Wilfredo Baldwin reports that he is doing well today but is continuing to have pain.  Pain Rating (0/10): 5    Objective     See Exercise, Manual, and Modality Logs for complete treatment.       Assessment/Plan  Pt able to tolerate manual therapy today with decreased tone noticed in L UT following manual intervention. Pt began feeling ill at the end of the session and deferred bike and UEB due to stomach irritation.     Plan  Progress per Plan of Care            Timed:         Manual Therapy:    10     mins  14506;     Therapeutic Exercise:    23     mins  91918;     Neuromuscular Nellie:        mins  68187;    Therapeutic Activity:          mins  89904;     Gait Training:           mins  73280;     Ultrasound:          mins  80801;    Ionto                                   mins   77864  Self Care                            mins   87735    Un-Timed:  Electrical Stimulation:    15     mins  39040 ( );  Dry Needling          mins self-pay  Traction          mins 20208  Low Eval          Mins  40951  Mod Eval          Mins  25359  High Eval                            Mins  14629  Re-Eval                               mins  05431    Timed Treatment:   33   mins   Total Treatment:     48   mins    Itzel Rouse PT, DPT  Physical Therapist

## 2019-12-05 ENCOUNTER — TREATMENT (OUTPATIENT)
Dept: PHYSICAL THERAPY | Facility: CLINIC | Age: 49
End: 2019-12-05

## 2019-12-05 DIAGNOSIS — M54.12 BRACHIAL NEURITIS: Primary | ICD-10-CM

## 2019-12-05 DIAGNOSIS — M54.2 CERVICALGIA: ICD-10-CM

## 2019-12-05 PROCEDURE — 97110 THERAPEUTIC EXERCISES: CPT | Performed by: PHYSICAL THERAPIST

## 2019-12-05 PROCEDURE — 97014 ELECTRIC STIMULATION THERAPY: CPT | Performed by: PHYSICAL THERAPIST

## 2019-12-05 PROCEDURE — 97140 MANUAL THERAPY 1/> REGIONS: CPT | Performed by: PHYSICAL THERAPIST

## 2019-12-05 NOTE — PROGRESS NOTES
Physical Therapy Daily Progress Note    VISIT#: 13    Subjective   Wilfredo Baldwin reports that his neck has been feeling better. He feels he is ready for discharge and is able to sleep better at night.  Pain Rating: 3    Objective     See Exercise, Manual, and Modality Logs for complete treatment.     Patient Education: discharge status, HEP    Assessment & Plan     Assessment  Assessment details: The patient was a good candidate for physical therapy, making improvements in strength, ROM, joint mobility, and pain reduction. He is now able to demonstrate adequate cervical ROM for driving and is able to sit for longer periods of time without an increase in symptoms. He is being discharged today due to meeting therapy goals and completion of current PT program. He is being discharged with an HEP for continuing self care.    Goals  Plan Goals: Plan Goals: STG's: 2 weeks   1. Patient will report a reduction in pain by >25%-MET  2. Patient will be able to perform HEP with minimal verbal cues-MET     LTG's: By discharge   1. Patient will report a reduction in pain by >75%-MET  2. Patient will have an improvement on the Neck Disability Index to demonstrate overall improvement in daily functional level-MET  3. Patient will be independent with undergarment dressing without pain-MET   4. Patient will be able to tolerate sitting > 60 minutes without increased pain-MET  5. Patient will demonstrate sufficient cervical AROM to allow patient to turn his head to view blindspots when driving-MET  6. Patient will be able to sleep > 5 hours without waking in pain-PARTIALLY MET  7. Patient will be independent with HEP-MET    Plan  Treatment plan discussed with: patient  Plan details: Discharge with HEP                Timed:         Manual Therapy:    15     mins  70139;     Therapeutic Exercise:    8     mins  90548;     Neuromuscular Nellie:        mins  11345;    Therapeutic Activity:          mins  64822;     Gait Training:            mins  56657;     Ultrasound:          mins  60969;    Ionto                                   mins   57519  Self Care                            mins   72580  Canalith Repos                   mins  16968    Un-Timed:  Electrical Stimulation:    15     mins  67775 ( );  Dry Needling          mins self-pay  Traction          mins 65947  Low Eval          Mins  93074  Mod Eval          Mins  46801  High Eval                            Mins  71312  Re-Eval                               mins  88955    Timed Treatment:   23   mins   Total Treatment:     40   mins    Merle Hernández PT  Physical Therapist  [place] License # [id number]

## 2020-01-03 ENCOUNTER — HOSPITAL ENCOUNTER (OUTPATIENT)
Dept: PAIN MEDICINE | Facility: HOSPITAL | Age: 50
Discharge: HOME | End: 2020-01-03

## 2020-01-03 PROCEDURE — 77003 FLUOROGUIDE FOR SPINE INJECT: CPT

## 2021-03-24 ENCOUNTER — BULK ORDERING (OUTPATIENT)
Dept: CASE MANAGEMENT | Facility: OTHER | Age: 51
End: 2021-03-24

## 2021-03-24 DIAGNOSIS — Z23 IMMUNIZATION DUE: ICD-10-CM
